# Patient Record
Sex: MALE | Race: WHITE | NOT HISPANIC OR LATINO | URBAN - METROPOLITAN AREA
[De-identification: names, ages, dates, MRNs, and addresses within clinical notes are randomized per-mention and may not be internally consistent; named-entity substitution may affect disease eponyms.]

---

## 2018-07-17 ENCOUNTER — OFFICE VISIT (OUTPATIENT)
Dept: URBAN - METROPOLITAN AREA CLINIC 17 | Facility: CLINIC | Age: 83
End: 2018-07-17
Payer: MEDICARE

## 2018-07-17 DIAGNOSIS — H35.351 CYSTOID MACULAR DEGENERATION, RIGHT EYE: Primary | ICD-10-CM

## 2018-07-17 PROCEDURE — 92134 CPTRZ OPH DX IMG PST SGM RTA: CPT | Performed by: OPTOMETRIST

## 2018-07-17 PROCEDURE — 92014 COMPRE OPH EXAM EST PT 1/>: CPT | Performed by: OPTOMETRIST

## 2018-07-17 ASSESSMENT — INTRAOCULAR PRESSURE
OS: 12
OD: 12

## 2018-07-17 NOTE — IMPRESSION/PLAN
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 OD. Condition: improving. Plan: Discussed diagnosis in detail with patient. Exam and OCT OD shows no active CME, recommend to D/c [place]. pt to return in 2 months for dilation and oct.

## 2019-10-23 ENCOUNTER — OFFICE VISIT (OUTPATIENT)
Dept: URBAN - METROPOLITAN AREA CLINIC 17 | Facility: CLINIC | Age: 84
End: 2019-10-23
Payer: MEDICARE

## 2019-10-23 DIAGNOSIS — H35.62 RETINAL HEMORRHAGE, LEFT EYE: ICD-10-CM

## 2019-10-23 DIAGNOSIS — Z96.1 PRESENCE OF INTRAOCULAR LENS: ICD-10-CM

## 2019-10-23 DIAGNOSIS — H43.813 VITREOUS DEGENERATION, BILATERAL: ICD-10-CM

## 2019-10-23 PROCEDURE — 92134 CPTRZ OPH DX IMG PST SGM RTA: CPT | Performed by: OPTOMETRIST

## 2019-10-23 PROCEDURE — 92014 COMPRE OPH EXAM EST PT 1/>: CPT | Performed by: OPTOMETRIST

## 2019-10-23 ASSESSMENT — VISUAL ACUITY
OS: 20/25
OD: 20/20

## 2019-10-23 ASSESSMENT — INTRAOCULAR PRESSURE
OD: 16
OS: 18

## 2019-10-23 NOTE — IMPRESSION/PLAN
Impression: Lattice degeneration of retina, bilateral: H35.413. Plan: No treatment is required at this time. Will continue to observe condition and or symptoms.

## 2019-10-23 NOTE — IMPRESSION/PLAN
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 Plan: No treatment is required at this time. Will continue to observe condition and or symptoms.

## 2019-10-23 NOTE — IMPRESSION/PLAN
Impression: Presence of intraocular lens: Z96.1. OD. Plan: IOL(s) positioned well.  Monitor with yearly dilated eye exam.

## 2020-10-23 ENCOUNTER — OFFICE VISIT (OUTPATIENT)
Dept: URBAN - METROPOLITAN AREA CLINIC 17 | Facility: CLINIC | Age: 85
End: 2020-10-23
Payer: MEDICARE

## 2020-10-23 DIAGNOSIS — H26.491 OTHER SECONDARY CATARACT, RIGHT EYE: ICD-10-CM

## 2020-10-23 DIAGNOSIS — H52.4 PRESBYOPIA: ICD-10-CM

## 2020-10-23 DIAGNOSIS — H35.413 LATTICE DEGENERATION OF RETINA, BILATERAL: ICD-10-CM

## 2020-10-23 PROCEDURE — 92014 COMPRE OPH EXAM EST PT 1/>: CPT | Performed by: OPTOMETRIST

## 2020-10-23 ASSESSMENT — INTRAOCULAR PRESSURE
OD: 15
OS: 15

## 2020-10-23 ASSESSMENT — VISUAL ACUITY
OS: 20/30
OD: 20/25

## 2021-10-27 ENCOUNTER — OFFICE VISIT (OUTPATIENT)
Dept: URBAN - METROPOLITAN AREA CLINIC 17 | Facility: CLINIC | Age: 86
End: 2021-10-27
Payer: MEDICARE

## 2021-10-27 DIAGNOSIS — H04.123 DRY EYE SYNDROME OF BILATERAL LACRIMAL GLANDS: ICD-10-CM

## 2021-10-27 DIAGNOSIS — H25.812 COMBINED FORMS OF AGE-RELATED CATARACT, LEFT EYE: Primary | ICD-10-CM

## 2021-10-27 PROCEDURE — 99214 OFFICE O/P EST MOD 30 MIN: CPT | Performed by: OPTOMETRIST

## 2021-10-27 ASSESSMENT — INTRAOCULAR PRESSURE
OS: 14
OD: 16

## 2021-10-27 NOTE — IMPRESSION/PLAN
Impression: Dry eye syndrome of bilateral lacrimal glands: H04.123. Plan: Dry eyes account for the patient's complaints. There is no evidence of permanent changes to the cornea. Explained condition does not have a cure and will need artificial tears for maintenance. Recommended PF AFT and Refresh PM QHS OU.

## 2021-10-27 NOTE — IMPRESSION/PLAN
Impression: Combined forms of age-related cataract, left eye: H25.812. Plan: Discussed diagnosis with patient. Cataract evaluation is recommended. Patient and patient's wife wish to hold off and will call in when ready to schedule consultation with Dr. Elie Loaiza.

## 2021-10-27 NOTE — IMPRESSION/PLAN
Impression: Presence of pseudophakia: Z96.1 Right. Plan: IOL(s) in good position. Continue to monitor with yearly dilated eye exams.